# Patient Record
Sex: MALE | Race: WHITE
[De-identification: names, ages, dates, MRNs, and addresses within clinical notes are randomized per-mention and may not be internally consistent; named-entity substitution may affect disease eponyms.]

---

## 2017-11-18 ENCOUNTER — HOSPITAL ENCOUNTER (INPATIENT)
Dept: HOSPITAL 82 - ED | Age: 57
LOS: 1 days | Discharge: NURSING, REHAB OR LONG-TERM CARE | DRG: 190 | End: 2017-11-19
Payer: MEDICARE

## 2017-11-18 VITALS — WEIGHT: 231.49 LBS | BODY MASS INDEX: 33.14 KG/M2 | HEIGHT: 70 IN

## 2017-11-18 VITALS — SYSTOLIC BLOOD PRESSURE: 115 MMHG | DIASTOLIC BLOOD PRESSURE: 80 MMHG

## 2017-11-18 VITALS — SYSTOLIC BLOOD PRESSURE: 120 MMHG | DIASTOLIC BLOOD PRESSURE: 74 MMHG

## 2017-11-18 DIAGNOSIS — J44.0: Primary | ICD-10-CM

## 2017-11-18 DIAGNOSIS — N18.6: ICD-10-CM

## 2017-11-18 DIAGNOSIS — J18.9: ICD-10-CM

## 2017-11-18 DIAGNOSIS — F17.210: ICD-10-CM

## 2017-11-18 DIAGNOSIS — J96.01: ICD-10-CM

## 2017-11-18 DIAGNOSIS — J44.1: ICD-10-CM

## 2017-11-18 DIAGNOSIS — I12.0: ICD-10-CM

## 2017-11-18 DIAGNOSIS — E11.22: ICD-10-CM

## 2017-11-18 DIAGNOSIS — G47.33: ICD-10-CM

## 2017-11-18 DIAGNOSIS — F79: ICD-10-CM

## 2017-11-18 DIAGNOSIS — Z99.2: ICD-10-CM

## 2017-11-18 LAB
ALBUMIN SERPL-MCNC: 4.8 G/DL (ref 3.2–5)
ALP SERPL-CCNC: 123 U/L (ref 38–126)
ALT SERPL-CCNC: 61 U/L (ref 21–72)
ANION GAP SERPL CALCULATED.3IONS-SCNC: 20 MMOL/L
AST SERPL-CCNC: 36 U/L (ref 17–59)
BASOPHILS NFR BLD AUTO: 1 % (ref 0–3)
BUN SERPL-MCNC: 30 MG/DL (ref 9–20)
BUN/CREAT SERPL: 4
CALCIUM SERPL-MCNC: 9.3 MG/DL (ref 8.4–10.2)
CHLORIDE SERPL-SCNC: 101 MMOL/L (ref 95–108)
CO2 SERPL-SCNC: 27 MMOL/L (ref 22–30)
CREAT SERPL-MCNC: 7.7 MG/DL (ref 0.7–1.3)
EOSINOPHIL NFR BLD AUTO: 2 % (ref 0–8)
ERYTHROCYTE [DISTWIDTH] IN BLOOD BY AUTOMATED COUNT: 14.2 % (ref 11.5–15.5)
GLUCOSE SERPL-MCNC: 140 MG/DL (ref 75–110)
HCT VFR BLD AUTO: 39.2 % (ref 39–50)
HGB BLD-MCNC: 12.6 G/DL (ref 14–18)
IMM GRANULOCYTES NFR BLD: 0.9 % (ref 0–1)
LYMPHOCYTES NFR BLD: 9 % (ref 15–41)
MCH RBC QN AUTO: 32.3 PG  CALC (ref 26–32)
MCHC RBC AUTO-ENTMCNC: 32.1 G/L CALC (ref 32–36)
MCV RBC AUTO: 100.5 FL  CALC (ref 80–100)
MONOCYTES NFR BLD AUTO: 11 % (ref 2–13)
MYOGLOBIN SERPL-MCNC: 868 NG/ML (ref 0–121)
NEUTROPHILS # BLD AUTO: 4.28 THOU/UL (ref 1.82–7.42)
NEUTROPHILS NFR BLD AUTO: 76 % (ref 42–76)
PLATELET # BLD AUTO: 120 THOU/UL (ref 130–400)
POTASSIUM SERPL-SCNC: 4.7 MMOL/L (ref 3.5–5.1)
PROT SERPL-MCNC: 8.4 G/DL (ref 6.3–8.2)
RBC # BLD AUTO: 3.9 MILL/UL (ref 4.7–6.1)
SODIUM SERPL-SCNC: 144 MMOL/L (ref 137–146)

## 2017-11-18 PROCEDURE — 5A09357 ASSISTANCE WITH RESPIRATORY VENTILATION, LESS THAN 24 CONSECUTIVE HOURS, CONTINUOUS POSITIVE AIRWAY PRESSURE: ICD-10-PCS

## 2017-11-19 VITALS — DIASTOLIC BLOOD PRESSURE: 58 MMHG | SYSTOLIC BLOOD PRESSURE: 103 MMHG

## 2017-11-19 VITALS — DIASTOLIC BLOOD PRESSURE: 62 MMHG | SYSTOLIC BLOOD PRESSURE: 111 MMHG

## 2017-11-19 VITALS — DIASTOLIC BLOOD PRESSURE: 69 MMHG | SYSTOLIC BLOOD PRESSURE: 100 MMHG

## 2017-11-19 VITALS — DIASTOLIC BLOOD PRESSURE: 58 MMHG | SYSTOLIC BLOOD PRESSURE: 99 MMHG

## 2019-06-08 ENCOUNTER — HOSPITAL ENCOUNTER (EMERGENCY)
Dept: HOSPITAL 82 - ED | Age: 59
Discharge: HOME | End: 2019-06-08
Payer: MEDICARE

## 2019-06-08 VITALS — BODY MASS INDEX: 35.03 KG/M2 | HEIGHT: 70 IN | WEIGHT: 244.71 LBS

## 2019-06-08 VITALS — SYSTOLIC BLOOD PRESSURE: 100 MMHG | DIASTOLIC BLOOD PRESSURE: 59 MMHG

## 2019-06-08 DIAGNOSIS — F17.210: ICD-10-CM

## 2019-06-08 DIAGNOSIS — L98.499: ICD-10-CM

## 2019-06-08 DIAGNOSIS — E11.22: ICD-10-CM

## 2019-06-08 DIAGNOSIS — E11.622: Primary | ICD-10-CM

## 2019-06-08 DIAGNOSIS — I12.0: ICD-10-CM

## 2019-06-08 DIAGNOSIS — N18.6: ICD-10-CM

## 2019-06-08 DIAGNOSIS — M79.642: ICD-10-CM

## 2019-06-08 DIAGNOSIS — Z99.2: ICD-10-CM

## 2019-06-08 DIAGNOSIS — R60.9: ICD-10-CM

## 2019-06-08 DIAGNOSIS — L08.9: ICD-10-CM

## 2019-06-08 LAB
ALBUMIN SERPL-MCNC: 4.5 G/DL (ref 3.2–5)
ALP SERPL-CCNC: 132 U/L (ref 38–126)
ANION GAP SERPL CALCULATED.3IONS-SCNC: 17 MMOL/L
AST SERPL-CCNC: 20 U/L (ref 17–59)
BASOPHILS NFR BLD AUTO: 1 % (ref 0–3)
BUN SERPL-MCNC: 21 MG/DL (ref 9–20)
BUN/CREAT SERPL: 3
CHLORIDE SERPL-SCNC: 98 MMOL/L (ref 95–108)
CO2 SERPL-SCNC: 27 MMOL/L (ref 22–30)
CREAT SERPL-MCNC: 7.5 MG/DL (ref 0.7–1.3)
EOSINOPHIL NFR BLD AUTO: 3 % (ref 0–8)
ERYTHROCYTE [DISTWIDTH] IN BLOOD BY AUTOMATED COUNT: 13.2 % (ref 11.5–15.5)
HCT VFR BLD AUTO: 41.1 % (ref 39–50)
HGB BLD-MCNC: 13.2 G/DL (ref 14–18)
IMM GRANULOCYTES NFR BLD: 1 % (ref 0–5)
LYMPHOCYTES NFR BLD: 15 % (ref 15–41)
MCH RBC QN AUTO: 30.4 PG  CALC (ref 26–32)
MCHC RBC AUTO-ENTMCNC: 32.1 G/L CALC (ref 32–36)
MCV RBC AUTO: 94.7 FL  CALC (ref 80–100)
MONOCYTES NFR BLD AUTO: 7 % (ref 2–13)
NEUTROPHILS # BLD AUTO: 4.48 THOU/UL (ref 1.82–7.42)
NEUTROPHILS NFR BLD AUTO: 74 % (ref 42–76)
PLATELET # BLD AUTO: 160 THOU/UL (ref 130–400)
POTASSIUM SERPL-SCNC: 4 MMOL/L (ref 3.5–5.1)
PROT SERPL-MCNC: 7.6 G/DL (ref 6.3–8.2)
RBC # BLD AUTO: 4.34 MILL/UL (ref 4.7–6.1)
SODIUM SERPL-SCNC: 138 MMOL/L (ref 137–146)

## 2019-06-10 ENCOUNTER — APPOINTMENT (RX ONLY)
Dept: URBAN - METROPOLITAN AREA CLINIC 54 | Facility: CLINIC | Age: 59
Setting detail: DERMATOLOGY
End: 2019-06-10

## 2019-06-10 DIAGNOSIS — F42.4 EXCORIATION (SKIN-PICKING) DISORDER: ICD-10-CM

## 2019-06-10 DIAGNOSIS — D485 NEOPLASM OF UNCERTAIN BEHAVIOR OF SKIN: ICD-10-CM

## 2019-06-10 PROBLEM — N18.6 END STAGE RENAL DISEASE: Status: ACTIVE | Noted: 2019-06-10

## 2019-06-10 PROBLEM — S60.922A UNSPECIFIED SUPERFICIAL INJURY OF LEFT HAND, INITIAL ENCOUNTER: Status: ACTIVE | Noted: 2019-06-10

## 2019-06-10 PROBLEM — E13.9 OTHER SPECIFIED DIABETES MELLITUS WITHOUT COMPLICATIONS: Status: ACTIVE | Noted: 2019-06-10

## 2019-06-10 PROBLEM — F41.9 ANXIETY DISORDER, UNSPECIFIED: Status: ACTIVE | Noted: 2019-06-10

## 2019-06-10 PROBLEM — D48.5 NEOPLASM OF UNCERTAIN BEHAVIOR OF SKIN: Status: ACTIVE | Noted: 2019-06-10

## 2019-06-10 PROBLEM — M12.9 ARTHROPATHY, UNSPECIFIED: Status: ACTIVE | Noted: 2019-06-10

## 2019-06-10 PROCEDURE — ? COUNSELING

## 2019-06-10 PROCEDURE — ? BIOPSY BY SHAVE METHOD

## 2019-06-10 PROCEDURE — 99202 OFFICE O/P NEW SF 15 MIN: CPT | Mod: 25

## 2019-06-10 PROCEDURE — ? PRESCRIPTION

## 2019-06-10 PROCEDURE — ? OTHER

## 2019-06-10 PROCEDURE — 11102 TANGNTL BX SKIN SINGLE LES: CPT

## 2019-06-10 RX ORDER — MUPIROCIN 20 MG/G
2% OINTMENT TOPICAL BID
Qty: 1 | Refills: 1 | Status: ERX | COMMUNITY
Start: 2019-06-10

## 2019-06-10 RX ADMIN — MUPIROCIN 2%: 20 OINTMENT TOPICAL at 19:23

## 2019-06-10 ASSESSMENT — LOCATION SIMPLE DESCRIPTION DERM
LOCATION SIMPLE: LEFT FOREARM
LOCATION SIMPLE: LEFT HAND

## 2019-06-10 ASSESSMENT — LOCATION ZONE DERM
LOCATION ZONE: HAND
LOCATION ZONE: ARM

## 2019-06-10 ASSESSMENT — LOCATION DETAILED DESCRIPTION DERM
LOCATION DETAILED: LEFT DISTAL RADIAL DORSAL FOREARM
LOCATION DETAILED: LEFT RADIAL DORSAL HAND

## 2019-06-10 NOTE — PROCEDURE: MIPS QUALITY
Quality 474: Zoster Vaccination Status: Shingrix Vaccination not Administered or Previously Received, Reason not Otherwise Specified
Quality 130: Documentation Of Current Medications In The Medical Record: Current Medications Documented
Quality 131: Pain Assessment And Follow-Up: Pain assessment using a standardized tool is documented as negative, no follow-up plan required
Detail Level: Simple
Additional Notes: The patient states pain as negative, and on a scale of 0-10, their pain level is 0.

## 2019-06-10 NOTE — PROCEDURE: BIOPSY BY SHAVE METHOD
Bill [id number] For Specimen Handling/Conveyance To Laboratory?: no
Billing Type: [employer]
Cryotherapy Text: The wound bed was treated with cryotherapy after the biopsy was performed.
Post-Care Instructions: I reviewed with the patient in detail post-care instructions. Patient is to keep the biopsy site dry overnight, and then apply vaseline daily until healed.
Size Of Lesion In Cm: 1.2
Wound Care: Petrolatum
Lab: [facility]
Depth Of Biopsy: dermis
Anesthesia Volume In Cc (Will Not Render If 0): 1
X Size Of Lesion In Cm: 0
Body Location Override (Optional - Billing Will Still Be Based On Selected Body Map Location If Applicable): left forearm
Electrodesiccation Text: The wound bed was treated with electrodesiccation after the biopsy was performed.
Notification Instructions: Patient will be notified of biopsy results. However, patient instructed to call the office if not contacted within 2 weeks.
Type Of Destruction Used: Curettage
Consent: The provider's intent is to obtain a tissue sample solely for diagnostic purposes. Written consent to obtain tissue sample was obtained and risks were reviewed including but not limited to scarring, infection, bleeding, scabbing, incomplete removal, nerve damage and allergy to anesthesia.
Biopsy Type: H and E
Lab Facility: [address] [facility]
Hemostasis: Lillie's
Detail Level: Simple
Dressing: pressure dressing
Render Post-Care Instructions In Note?: yes
Electrodesiccation And Curettage Text: The wound bed was treated with electrodesiccation and curettage after the biopsy was performed.
Anesthesia Type: 1% lidocaine without epinephrine
Biopsy Method: Personna blade
Silver Nitrate Text: The wound bed was treated with silver nitrate after the biopsy was performed.

## 2019-07-30 ENCOUNTER — HOSPITAL ENCOUNTER (EMERGENCY)
Dept: HOSPITAL 82 - ED | Age: 59
Discharge: HOME | End: 2019-07-30
Payer: MEDICARE

## 2019-07-30 VITALS — BODY MASS INDEX: 32.98 KG/M2 | WEIGHT: 230.38 LBS | HEIGHT: 70 IN

## 2019-07-30 VITALS — SYSTOLIC BLOOD PRESSURE: 126 MMHG | DIASTOLIC BLOOD PRESSURE: 75 MMHG

## 2019-07-30 DIAGNOSIS — G51.0: Primary | ICD-10-CM

## 2022-04-13 ENCOUNTER — HOSPITAL ENCOUNTER (EMERGENCY)
Dept: HOSPITAL 82 - ED | Age: 62
Discharge: LEFT BEFORE DISCHARGE | End: 2022-04-13
Payer: MEDICARE

## 2022-04-13 VITALS — SYSTOLIC BLOOD PRESSURE: 124 MMHG | DIASTOLIC BLOOD PRESSURE: 69 MMHG

## 2022-04-13 VITALS — DIASTOLIC BLOOD PRESSURE: 77 MMHG | SYSTOLIC BLOOD PRESSURE: 137 MMHG

## 2022-04-13 VITALS — SYSTOLIC BLOOD PRESSURE: 126 MMHG | DIASTOLIC BLOOD PRESSURE: 69 MMHG

## 2022-04-13 VITALS — DIASTOLIC BLOOD PRESSURE: 76 MMHG | SYSTOLIC BLOOD PRESSURE: 140 MMHG

## 2022-04-13 VITALS — DIASTOLIC BLOOD PRESSURE: 76 MMHG | SYSTOLIC BLOOD PRESSURE: 137 MMHG

## 2022-04-13 VITALS — SYSTOLIC BLOOD PRESSURE: 130 MMHG | DIASTOLIC BLOOD PRESSURE: 74 MMHG

## 2022-04-13 VITALS — DIASTOLIC BLOOD PRESSURE: 72 MMHG | SYSTOLIC BLOOD PRESSURE: 131 MMHG

## 2022-04-13 VITALS — BODY MASS INDEX: 27.46 KG/M2 | HEIGHT: 70 IN | WEIGHT: 191.8 LBS

## 2022-04-13 VITALS — DIASTOLIC BLOOD PRESSURE: 80 MMHG | SYSTOLIC BLOOD PRESSURE: 137 MMHG

## 2022-04-13 VITALS — DIASTOLIC BLOOD PRESSURE: 79 MMHG | SYSTOLIC BLOOD PRESSURE: 144 MMHG

## 2022-04-13 VITALS — SYSTOLIC BLOOD PRESSURE: 127 MMHG | DIASTOLIC BLOOD PRESSURE: 67 MMHG

## 2022-04-13 VITALS — DIASTOLIC BLOOD PRESSURE: 76 MMHG | SYSTOLIC BLOOD PRESSURE: 132 MMHG

## 2022-04-13 VITALS — SYSTOLIC BLOOD PRESSURE: 128 MMHG | DIASTOLIC BLOOD PRESSURE: 76 MMHG

## 2022-04-13 VITALS — SYSTOLIC BLOOD PRESSURE: 139 MMHG | DIASTOLIC BLOOD PRESSURE: 76 MMHG

## 2022-04-13 DIAGNOSIS — J18.9: ICD-10-CM

## 2022-04-13 DIAGNOSIS — Z91.19: ICD-10-CM

## 2022-04-13 DIAGNOSIS — I12.0: ICD-10-CM

## 2022-04-13 DIAGNOSIS — N18.6: ICD-10-CM

## 2022-04-13 DIAGNOSIS — E11.649: Primary | ICD-10-CM

## 2022-04-13 DIAGNOSIS — Z99.2: ICD-10-CM

## 2022-04-13 DIAGNOSIS — E11.22: ICD-10-CM

## 2022-04-13 LAB
ALBUMIN SERPL-MCNC: 4.5 G/DL (ref 3.2–5)
ALP SERPL-CCNC: 96 U/L (ref 38–126)
ANION GAP SERPL CALCULATED.3IONS-SCNC: 18 MMOL/L
AST SERPL-CCNC: 26 U/L (ref 17–59)
BASOPHILS NFR BLD AUTO: 1 % (ref 0–3)
BUN SERPL-MCNC: 41 MG/DL (ref 9–20)
BUN/CREAT SERPL: 5
CHLORIDE SERPL-SCNC: 96 MMOL/L (ref 95–108)
CO2 SERPL-SCNC: 26 MMOL/L (ref 22–30)
CREAT SERPL-MCNC: 8.1 MG/DL (ref 0.7–1.3)
EOSINOPHIL NFR BLD AUTO: 5 % (ref 0–8)
ERYTHROCYTE [DISTWIDTH] IN BLOOD BY AUTOMATED COUNT: 15.9 % (ref 11.5–15.5)
HCT VFR BLD AUTO: 35.1 % (ref 39–50)
HGB BLD-MCNC: 11 G/DL (ref 14–18)
IMM GRANULOCYTES NFR BLD: 0.4 % (ref 0–5)
LYMPHOCYTES NFR BLD: 12 % (ref 15–41)
MAGNESIUM SERPL-MCNC: 2.6 MG/DL (ref 1.6–2.3)
MCH RBC QN AUTO: 28.7 PG  CALC (ref 26–32)
MCHC RBC AUTO-ENTMCNC: 31.3 G/DL CAL (ref 32–36)
MCV RBC AUTO: 91.6 FL  CALC (ref 80–100)
MONOCYTES NFR BLD AUTO: 11 % (ref 2–13)
NEUTROPHILS # BLD AUTO: 3.49 THOU/UL (ref 1.82–7.42)
NEUTROPHILS NFR BLD AUTO: 71 % (ref 42–76)
PLATELET # BLD AUTO: 146 THOU/UL (ref 130–400)
POTASSIUM SERPL-SCNC: 5.2 MMOL/L (ref 3.5–5.1)
PROT SERPL-MCNC: 7.9 G/DL (ref 6.3–8.2)
RBC # BLD AUTO: 3.83 MILL/UL (ref 4.7–6.1)
SODIUM SERPL-SCNC: 135 MMOL/L (ref 137–146)

## 2022-10-13 ENCOUNTER — HOSPITAL ENCOUNTER (EMERGENCY)
Dept: HOSPITAL 82 - ED | Age: 62
Discharge: HOME | End: 2022-10-13
Payer: MEDICARE

## 2022-10-13 VITALS — DIASTOLIC BLOOD PRESSURE: 89 MMHG | SYSTOLIC BLOOD PRESSURE: 132 MMHG

## 2022-10-13 VITALS — DIASTOLIC BLOOD PRESSURE: 86 MMHG | SYSTOLIC BLOOD PRESSURE: 145 MMHG

## 2022-10-13 VITALS — DIASTOLIC BLOOD PRESSURE: 74 MMHG | SYSTOLIC BLOOD PRESSURE: 143 MMHG

## 2022-10-13 VITALS — SYSTOLIC BLOOD PRESSURE: 140 MMHG | DIASTOLIC BLOOD PRESSURE: 82 MMHG

## 2022-10-13 VITALS — DIASTOLIC BLOOD PRESSURE: 89 MMHG | SYSTOLIC BLOOD PRESSURE: 134 MMHG

## 2022-10-13 VITALS — SYSTOLIC BLOOD PRESSURE: 138 MMHG | DIASTOLIC BLOOD PRESSURE: 70 MMHG

## 2022-10-13 VITALS — DIASTOLIC BLOOD PRESSURE: 64 MMHG | SYSTOLIC BLOOD PRESSURE: 123 MMHG

## 2022-10-13 VITALS — SYSTOLIC BLOOD PRESSURE: 131 MMHG | DIASTOLIC BLOOD PRESSURE: 76 MMHG

## 2022-10-13 VITALS — DIASTOLIC BLOOD PRESSURE: 82 MMHG | SYSTOLIC BLOOD PRESSURE: 153 MMHG

## 2022-10-13 VITALS — DIASTOLIC BLOOD PRESSURE: 89 MMHG | SYSTOLIC BLOOD PRESSURE: 152 MMHG

## 2022-10-13 VITALS — SYSTOLIC BLOOD PRESSURE: 147 MMHG | DIASTOLIC BLOOD PRESSURE: 79 MMHG

## 2022-10-13 VITALS — DIASTOLIC BLOOD PRESSURE: 79 MMHG | SYSTOLIC BLOOD PRESSURE: 148 MMHG

## 2022-10-13 VITALS — DIASTOLIC BLOOD PRESSURE: 92 MMHG | SYSTOLIC BLOOD PRESSURE: 159 MMHG

## 2022-10-13 VITALS — DIASTOLIC BLOOD PRESSURE: 84 MMHG | SYSTOLIC BLOOD PRESSURE: 149 MMHG

## 2022-10-13 VITALS — WEIGHT: 215.39 LBS | HEIGHT: 70 IN | BODY MASS INDEX: 30.84 KG/M2

## 2022-10-13 VITALS — DIASTOLIC BLOOD PRESSURE: 65 MMHG | SYSTOLIC BLOOD PRESSURE: 130 MMHG

## 2022-10-13 DIAGNOSIS — M54.50: ICD-10-CM

## 2022-10-13 DIAGNOSIS — Z99.2: ICD-10-CM

## 2022-10-13 DIAGNOSIS — N18.6: ICD-10-CM

## 2022-10-13 DIAGNOSIS — E11.22: ICD-10-CM

## 2022-10-13 DIAGNOSIS — I12.0: ICD-10-CM

## 2022-10-13 DIAGNOSIS — M54.6: Primary | ICD-10-CM

## 2022-10-14 ENCOUNTER — HOSPITAL ENCOUNTER (EMERGENCY)
Dept: HOSPITAL 82 - ED | Age: 62
LOS: 1 days | Discharge: ANOTHER HOSPITAL | End: 2022-10-15
Payer: MEDICARE

## 2022-10-14 VITALS — DIASTOLIC BLOOD PRESSURE: 80 MMHG | SYSTOLIC BLOOD PRESSURE: 140 MMHG

## 2022-10-14 VITALS — SYSTOLIC BLOOD PRESSURE: 129 MMHG | DIASTOLIC BLOOD PRESSURE: 73 MMHG

## 2022-10-14 VITALS — SYSTOLIC BLOOD PRESSURE: 138 MMHG | DIASTOLIC BLOOD PRESSURE: 78 MMHG

## 2022-10-14 VITALS — SYSTOLIC BLOOD PRESSURE: 141 MMHG | DIASTOLIC BLOOD PRESSURE: 84 MMHG

## 2022-10-14 VITALS — SYSTOLIC BLOOD PRESSURE: 129 MMHG | DIASTOLIC BLOOD PRESSURE: 68 MMHG

## 2022-10-14 VITALS — DIASTOLIC BLOOD PRESSURE: 72 MMHG | SYSTOLIC BLOOD PRESSURE: 128 MMHG

## 2022-10-14 VITALS — DIASTOLIC BLOOD PRESSURE: 77 MMHG | SYSTOLIC BLOOD PRESSURE: 130 MMHG

## 2022-10-14 VITALS — SYSTOLIC BLOOD PRESSURE: 130 MMHG | DIASTOLIC BLOOD PRESSURE: 73 MMHG

## 2022-10-14 VITALS — WEIGHT: 186.29 LBS | BODY MASS INDEX: 26.67 KG/M2 | HEIGHT: 70 IN

## 2022-10-14 VITALS — SYSTOLIC BLOOD PRESSURE: 122 MMHG | DIASTOLIC BLOOD PRESSURE: 63 MMHG

## 2022-10-14 VITALS — SYSTOLIC BLOOD PRESSURE: 137 MMHG | DIASTOLIC BLOOD PRESSURE: 78 MMHG

## 2022-10-14 VITALS — DIASTOLIC BLOOD PRESSURE: 73 MMHG | SYSTOLIC BLOOD PRESSURE: 125 MMHG

## 2022-10-14 VITALS — SYSTOLIC BLOOD PRESSURE: 137 MMHG | DIASTOLIC BLOOD PRESSURE: 80 MMHG

## 2022-10-14 VITALS — SYSTOLIC BLOOD PRESSURE: 129 MMHG | DIASTOLIC BLOOD PRESSURE: 70 MMHG

## 2022-10-14 VITALS — SYSTOLIC BLOOD PRESSURE: 130 MMHG | DIASTOLIC BLOOD PRESSURE: 67 MMHG

## 2022-10-14 VITALS — DIASTOLIC BLOOD PRESSURE: 69 MMHG | SYSTOLIC BLOOD PRESSURE: 130 MMHG

## 2022-10-14 VITALS — SYSTOLIC BLOOD PRESSURE: 131 MMHG | DIASTOLIC BLOOD PRESSURE: 71 MMHG

## 2022-10-14 VITALS — SYSTOLIC BLOOD PRESSURE: 136 MMHG | DIASTOLIC BLOOD PRESSURE: 82 MMHG

## 2022-10-14 VITALS — SYSTOLIC BLOOD PRESSURE: 127 MMHG | DIASTOLIC BLOOD PRESSURE: 71 MMHG

## 2022-10-14 DIAGNOSIS — I12.0: ICD-10-CM

## 2022-10-14 DIAGNOSIS — K42.0: Primary | ICD-10-CM

## 2022-10-14 DIAGNOSIS — K72.10: ICD-10-CM

## 2022-10-14 DIAGNOSIS — Z99.2: ICD-10-CM

## 2022-10-14 DIAGNOSIS — R62.50: ICD-10-CM

## 2022-10-14 DIAGNOSIS — N18.6: ICD-10-CM

## 2022-10-14 LAB
ALBUMIN SERPL-MCNC: 3.9 G/DL (ref 3.2–5)
ALP SERPL-CCNC: 116 U/L (ref 38–126)
AMYLASE SERPL-CCNC: 48 U/L (ref 30–110)
ANION GAP SERPL CALCULATED.3IONS-SCNC: 20 MMOL/L
AST SERPL-CCNC: 23 U/L (ref 17–59)
BASOPHILS NFR BLD AUTO: 1 % (ref 0–3)
BUN SERPL-MCNC: 51 MG/DL (ref 9–20)
BUN/CREAT SERPL: 7
CHLORIDE SERPL-SCNC: 85 MMOL/L (ref 95–108)
CO2 SERPL-SCNC: 36 MMOL/L (ref 22–30)
CREAT SERPL-MCNC: 7.7 MG/DL (ref 0.7–1.3)
EOSINOPHIL NFR BLD AUTO: 1 % (ref 0–8)
ERYTHROCYTE [DISTWIDTH] IN BLOOD BY AUTOMATED COUNT: 14.6 % (ref 11.5–15.5)
HCT VFR BLD AUTO: 39.6 % (ref 39–50)
HGB BLD-MCNC: 12.4 G/DL (ref 14–18)
IMM GRANULOCYTES NFR BLD: 0 % (ref 0–5)
INR PPP: 1.3 RATIO (ref 0.7–1.3)
LIPASE SERPL-CCNC: 59 U/L (ref 23–300)
LYMPHOCYTES NFR BLD: 15 % (ref 15–41)
MAGNESIUM SERPL-MCNC: 2.5 MG/DL (ref 1.6–2.3)
MCH RBC QN AUTO: 27.9 PG  CALC (ref 26–32)
MCHC RBC AUTO-ENTMCNC: 31.3 G/DL CAL (ref 32–36)
MCV RBC AUTO: 89 FL  CALC (ref 80–100)
MONOCYTES NFR BLD AUTO: 19 % (ref 2–13)
NEUTROPHILS # BLD AUTO: 1.72 THOU/UL (ref 1.82–7.42)
NEUTROPHILS NFR BLD AUTO: 64 % (ref 42–76)
PLATELET # BLD AUTO: 178 THOU/UL (ref 130–400)
POTASSIUM SERPL-SCNC: 4.6 MMOL/L (ref 3.5–5.1)
PROT SERPL-MCNC: 6.9 G/DL (ref 6.3–8.2)
PROTHROMBIN TIME: 12.7 SECONDS (ref 9–12.5)
RBC # BLD AUTO: 4.45 MILL/UL (ref 4.7–6.1)
SODIUM SERPL-SCNC: 136 MMOL/L (ref 137–146)

## 2022-10-15 VITALS — DIASTOLIC BLOOD PRESSURE: 73 MMHG | SYSTOLIC BLOOD PRESSURE: 129 MMHG

## 2022-10-15 VITALS — SYSTOLIC BLOOD PRESSURE: 131 MMHG | DIASTOLIC BLOOD PRESSURE: 71 MMHG

## 2022-10-15 VITALS — DIASTOLIC BLOOD PRESSURE: 75 MMHG | SYSTOLIC BLOOD PRESSURE: 129 MMHG

## 2022-10-15 VITALS — SYSTOLIC BLOOD PRESSURE: 127 MMHG | DIASTOLIC BLOOD PRESSURE: 72 MMHG

## 2022-10-15 VITALS — SYSTOLIC BLOOD PRESSURE: 132 MMHG | DIASTOLIC BLOOD PRESSURE: 75 MMHG

## 2022-11-11 ENCOUNTER — HOSPITAL ENCOUNTER (EMERGENCY)
Dept: HOSPITAL 82 - ED | Age: 62
Discharge: HOME | End: 2022-11-11
Payer: MEDICARE

## 2022-11-11 VITALS — HEIGHT: 70 IN | WEIGHT: 170.42 LBS | BODY MASS INDEX: 24.4 KG/M2

## 2022-11-11 VITALS — SYSTOLIC BLOOD PRESSURE: 133 MMHG | DIASTOLIC BLOOD PRESSURE: 70 MMHG

## 2022-11-11 DIAGNOSIS — I12.0: ICD-10-CM
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
## 2022-11-11 DIAGNOSIS — F17.200: ICD-10-CM

## 2022-11-11 DIAGNOSIS — N18.6: ICD-10-CM

## 2022-11-11 DIAGNOSIS — R47.01: ICD-10-CM

## 2022-11-11 DIAGNOSIS — Z99.2: ICD-10-CM

## 2022-11-11 DIAGNOSIS — L76.34: Primary | ICD-10-CM

## 2022-11-11 DIAGNOSIS — E11.22: ICD-10-CM

## 2023-12-18 ENCOUNTER — HOSPITAL ENCOUNTER (EMERGENCY)
Dept: HOSPITAL 82 - ED | Age: 63
Discharge: HOME | End: 2023-12-18
Payer: MEDICARE

## 2023-12-18 VITALS — SYSTOLIC BLOOD PRESSURE: 121 MMHG | DIASTOLIC BLOOD PRESSURE: 59 MMHG

## 2023-12-18 VITALS — DIASTOLIC BLOOD PRESSURE: 81 MMHG | SYSTOLIC BLOOD PRESSURE: 148 MMHG

## 2023-12-18 VITALS — DIASTOLIC BLOOD PRESSURE: 75 MMHG | SYSTOLIC BLOOD PRESSURE: 130 MMHG

## 2023-12-18 VITALS — SYSTOLIC BLOOD PRESSURE: 129 MMHG | DIASTOLIC BLOOD PRESSURE: 81 MMHG

## 2023-12-18 VITALS — SYSTOLIC BLOOD PRESSURE: 147 MMHG | DIASTOLIC BLOOD PRESSURE: 81 MMHG

## 2023-12-18 VITALS — DIASTOLIC BLOOD PRESSURE: 81 MMHG | SYSTOLIC BLOOD PRESSURE: 147 MMHG

## 2023-12-18 VITALS — SYSTOLIC BLOOD PRESSURE: 145 MMHG | DIASTOLIC BLOOD PRESSURE: 82 MMHG

## 2023-12-18 VITALS — DIASTOLIC BLOOD PRESSURE: 73 MMHG | SYSTOLIC BLOOD PRESSURE: 140 MMHG

## 2023-12-18 VITALS — SYSTOLIC BLOOD PRESSURE: 123 MMHG | DIASTOLIC BLOOD PRESSURE: 59 MMHG

## 2023-12-18 VITALS — WEIGHT: 187.39 LBS | BODY MASS INDEX: 28.4 KG/M2 | HEIGHT: 68 IN

## 2023-12-18 VITALS — DIASTOLIC BLOOD PRESSURE: 79 MMHG | SYSTOLIC BLOOD PRESSURE: 144 MMHG

## 2023-12-18 VITALS — SYSTOLIC BLOOD PRESSURE: 124 MMHG | DIASTOLIC BLOOD PRESSURE: 72 MMHG

## 2023-12-18 DIAGNOSIS — E11.22: ICD-10-CM

## 2023-12-18 DIAGNOSIS — S90.822A: Primary | ICD-10-CM

## 2023-12-18 DIAGNOSIS — N18.6: ICD-10-CM

## 2023-12-18 DIAGNOSIS — F17.200: ICD-10-CM
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
## 2023-12-18 DIAGNOSIS — Z99.2: ICD-10-CM

## 2023-12-18 DIAGNOSIS — I12.0: ICD-10-CM

## 2023-12-18 PROCEDURE — 0H9NXZZ DRAINAGE OF LEFT FOOT SKIN, EXTERNAL APPROACH: ICD-10-PCS | Performed by: FAMILY MEDICINE

## 2024-07-13 ENCOUNTER — HOSPITAL ENCOUNTER (EMERGENCY)
Dept: HOSPITAL 82 - ED | Age: 64
Discharge: HOME | End: 2024-07-13
Payer: MEDICARE

## 2024-07-13 VITALS — SYSTOLIC BLOOD PRESSURE: 149 MMHG | DIASTOLIC BLOOD PRESSURE: 69 MMHG

## 2024-07-13 VITALS — BODY MASS INDEX: 26.66 KG/M2 | WEIGHT: 179.99 LBS | HEIGHT: 69 IN

## 2024-07-13 VITALS — DIASTOLIC BLOOD PRESSURE: 77 MMHG | SYSTOLIC BLOOD PRESSURE: 162 MMHG

## 2024-07-13 DIAGNOSIS — E11.22: ICD-10-CM

## 2024-07-13 DIAGNOSIS — I12.0: ICD-10-CM

## 2024-07-13 DIAGNOSIS — N18.6: ICD-10-CM

## 2024-07-13 DIAGNOSIS — F79: ICD-10-CM
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
## 2024-07-13 DIAGNOSIS — S50.862A: Primary | ICD-10-CM

## 2024-07-13 DIAGNOSIS — Z99.2: ICD-10-CM

## 2025-01-26 ENCOUNTER — HOSPITAL ENCOUNTER (EMERGENCY)
Dept: HOSPITAL 82 - ED | Age: 65
Discharge: HOME | End: 2025-01-26
Payer: MEDICARE

## 2025-01-26 VITALS — SYSTOLIC BLOOD PRESSURE: 111 MMHG | DIASTOLIC BLOOD PRESSURE: 52 MMHG

## 2025-01-26 VITALS — BODY MASS INDEX: 23.84 KG/M2 | WEIGHT: 160.94 LBS | HEIGHT: 69 IN

## 2025-01-26 VITALS — SYSTOLIC BLOOD PRESSURE: 110 MMHG | DIASTOLIC BLOOD PRESSURE: 52 MMHG

## 2025-01-26 DIAGNOSIS — L02.413: Primary | ICD-10-CM

## 2025-01-26 DIAGNOSIS — E11.22: ICD-10-CM

## 2025-01-26 DIAGNOSIS — Z99.2: ICD-10-CM

## 2025-01-26 DIAGNOSIS — I12.0: ICD-10-CM

## 2025-01-26 DIAGNOSIS — N18.6: ICD-10-CM

## 2025-01-26 PROCEDURE — 0H9DXZZ DRAINAGE OF RIGHT LOWER ARM SKIN, EXTERNAL APPROACH: ICD-10-PCS | Performed by: FAMILY MEDICINE
